# Patient Record
Sex: MALE | Race: WHITE | NOT HISPANIC OR LATINO | URBAN - METROPOLITAN AREA
[De-identification: names, ages, dates, MRNs, and addresses within clinical notes are randomized per-mention and may not be internally consistent; named-entity substitution may affect disease eponyms.]

---

## 2022-10-02 ENCOUNTER — EMERGENCY (EMERGENCY)
Facility: HOSPITAL | Age: 67
LOS: 1 days | Discharge: HOME | End: 2022-10-02
Attending: STUDENT IN AN ORGANIZED HEALTH CARE EDUCATION/TRAINING PROGRAM | Admitting: STUDENT IN AN ORGANIZED HEALTH CARE EDUCATION/TRAINING PROGRAM
Payer: MEDICARE

## 2022-10-02 VITALS
HEART RATE: 95 BPM | RESPIRATION RATE: 18 BRPM | TEMPERATURE: 98 F | OXYGEN SATURATION: 98 % | DIASTOLIC BLOOD PRESSURE: 67 MMHG | SYSTOLIC BLOOD PRESSURE: 113 MMHG

## 2022-10-02 VITALS
SYSTOLIC BLOOD PRESSURE: 98 MMHG | DIASTOLIC BLOOD PRESSURE: 64 MMHG | RESPIRATION RATE: 18 BRPM | HEART RATE: 107 BPM | TEMPERATURE: 98 F | OXYGEN SATURATION: 100 %

## 2022-10-02 DIAGNOSIS — Z90.410 ACQUIRED TOTAL ABSENCE OF PANCREAS: Chronic | ICD-10-CM

## 2022-10-02 PROCEDURE — 99285 EMERGENCY DEPT VISIT HI MDM: CPT

## 2022-10-02 PROCEDURE — 99284 EMERGENCY DEPT VISIT MOD MDM: CPT

## 2022-10-02 NOTE — ED ADULT NURSE REASSESSMENT NOTE - NS ED NURSE REASSESS COMMENT FT1
pt states "I don't want that." RN educated patient that the EKG was to check her heart. pt states "that's not how you check the heart"

## 2022-10-02 NOTE — ED PROVIDER NOTE - CLINICAL SUMMARY MEDICAL DECISION MAKING FREE TEXT BOX
Patient is a 68 y/o M w/ PMHx of small bowel carcinoma s/p whipple who is presenting with weakness in the setting of alcohol and marijuana use. Vital signs significant for tachycardia and hypotension.     Patient currently refusing all physical exam and EKG. States that he "feels great" and would like to leave the ED.

## 2022-10-02 NOTE — ED PROVIDER NOTE - PATIENT PORTAL LINK FT
You can access the FollowMyHealth Patient Portal offered by [facility] by registering at the following website: [email or web address]. By joining [employer]’s FollowMyHealth portal, you will also be able to view your health information using other applications (apps) compatible with our system.

## 2022-10-02 NOTE — ED PROVIDER NOTE - CROS ED EYES ALL NEG
"You can access the [facility], offered by [facility], by registering with the following website: [email or web address]" negative...

## 2022-10-02 NOTE — ED PROVIDER NOTE - OBJECTIVE STATEMENT
Patient is a 66 y/o M w/ PMHx of small bowel carcinoma s/p [place] who is presenting with weakness. Patient states that around 2300 tonight he was drinking with his friends (~4 shots of tequila) and smoked half a joint of marijuana when he stumbled and felt weak. Denies any falls or trauma. He states that his friends called the ambulance to bring him to the ED. Currently states that he feels well and wants to leave the ED. Patient denies any fever, chills, SOB, chest pain, abdominal pain, nausea/vomiting, diarrhea, or urinary symptoms.

## 2022-10-02 NOTE — ED PROVIDER NOTE - NSFOLLOWUPINSTRUCTIONS_ED_ALL_ED_FT
Drink plenty of fluids, rest.  Curb your alcohol intake.   Follow up with your primary care physician for re-evaluation.  Return to er for any new or worsening symptoms.                  Log Out.      [employer] CareNotes®     :  [facility]  	                     ALCOHOL INTOXICATION - AfterCare(R) Instructions(ER/ED)           Alcohol Intoxication    WHAT YOU NEED TO KNOW:    Alcohol intoxication is a harmful physical condition caused when you drink more alcohol than your body can handle. It is also called ethanol poisoning, or being drunk.    DISCHARGE INSTRUCTIONS:    Call your local emergency number (911 in the [place]) if:   •You have sudden trouble breathing or chest pain.      •You have a seizure.      •You feel sad enough to harm yourself or others.      Call your doctor if:   •You have hallucinations (you see or hear things that are not real).      •You cannot stop vomiting.      •You have questions or concerns about your condition or care.      Recommended alcohol limits:   •Men 21 to 64 years should limit alcohol to 2 drinks a day. Do not have more than 4 drinks in 1 day or more than 14 in 1 week.      •All women, and men 65 or older should limit alcohol to 1 drink in a day. Do not have more than 3 drinks in 1 day or more than 7 in 1 week. No amount of alcohol is okay during pregnancy.      Manage alcohol use:   •Decrease the amount you drink. This can help prevent health problems such as brain, heart, and liver damage, high blood pressure, diabetes, and cancer. If you cannot stop completely, healthcare providers can help you set goals to decrease the amount you drink.      •Plan weekly alcohol use. You will be less likely to drink more than the recommended limit if you plan ahead.      •Have food when you drink alcohol. Food will prevent alcohol from getting into your system too quickly. Eat before you have your first alcohol drink.      •Time your drinks carefully. Have no more than 1 drink in an hour. Have a liquid such as water, coffee, or a soft drink between alcohol drinks.      •Do not drive if you have had alcohol. Make sure someone who has not been drinking can help you get home.      •Do not drink alcohol if you are taking medicine. Alcohol is dangerous when you combine it with certain medicines, such as acetaminophen or blood pressure medicine. Talk to your healthcare provider about all the medicines you currently take.      For more information:   •Alcoholics Anonymous  Web Address: http://www.aa.org      •Substance Abuse and Mental Health Services Administration  [address]  [place] [address]  Web Address: http://www.[facility].gov        Follow up with your doctor as directed: Write down your questions so you remember to ask them during your visits.       © Copyright [employer] 2022           back to top                          © Copyright [employer] 2022

## 2022-10-02 NOTE — ED ADULT NURSE REASSESSMENT NOTE - NS ED NURSE REASSESS COMMENT FT1
pt endorsed to me from [facility] Donald, c/o weakness last night. as per report " pt was drinking and use marijuana last night" pending sobriety.

## 2022-10-02 NOTE — ED PROVIDER NOTE - NS ED ATTENDING STATEMENT MOD
I have seen and examined this patient and fully participated in the care of this patient as the teaching attending.  The service was shared with the BRIEN.  I reviewed and verified the documentation and independently performed the documented:

## 2022-10-02 NOTE — ED PROVIDER NOTE - ATTENDING CONTRIBUTION TO CARE
66 y/o M w/ PMHx of small bowel carcinoma s/p whipple who is presenting with weakness. Patient states that around 2300 tonight he was drinking with his friends (~4 shots of tequila) and smoked half a joint of marijuana when he stumbled and felt weak. Denies any falls or trauma. He states that his friends called the ambulance to bring him to the ED. Currently states that he feels well and wants to leave the ED. on exam     General: Awake, alert and oriented. smells like alcohol, No acute distress. Well developed, hydrated and nourished. Appears stated age.   Skin: Skin in warm, dry and intact without rashes or lesions. Appropriate color for ethnicity  HENMT: head normocephalic and atraumatic; bilateral external ears without swelling. no nasal discharge. moist oral mucosa. supple neck, trachea midline  EYES: Conjunctiva clear. nonicteric sclera. EOM intact, Eyelids are normal in appearance without swelling or lesions.  Cardiac: well perfused  Respiratory: breathing comfortably on room air. no audible wheezing or stridor  Abdominal: nondistended, soft, nontender  MSK: Neck and back are without deformity, visible external skin changes, or signs of trauma. Curvature of the cervical, thoracic, and lumbar spine are within normal limits. no external signs of trauma. no apparent deficits in ROM of any extremity  Neurological: The patient is awake, alert and oriented to person, place, and time with normal speech. CN 2-12 grossly intact. moving all extremities spontaenously, able to ambulate, though very unsteady  Psychiatric: Appropriate mood and affect. Good judgement and insight. No visual or auditory hallucinations.     mild tachcyardia and hypotension, medical workup reccomended patient declines refuses poc glucose and other laboratory workup as he says he is "all right" and just needs to go home. unable to ambulate safely at this time, likely experiencing symptoms 2/2 intoxication. will monitor in ED and reasess

## 2022-10-02 NOTE — ED PROVIDER NOTE - PROGRESS NOTE DETAILS
patient sleeping, easily arousable, still too unsteady on his feet, continues to decline medical workup. alert and oriented to his situtation and surroundings. patient sleeping, easily arousable, still too unsteady on his feet, continues to decline medical workup. alert and oriented to his situation and surroundings. Ree: pt received from night team at s/o; pt presents intoxicated with unsteady gait. No signs of head trauma. Pending sobriety. Will continue to monitor. Ree: pt re-assessed and feeling much better, ambulatory to/from bathroom with steady gait. Pt is A&O x 3, states he will hail a taxi home. Pt is clinically sober for dc.

## 2022-10-02 NOTE — ED PROVIDER NOTE - PHYSICAL EXAMINATION
Patient currently refusing all physical exam. States that he "feels great" and wants to leave the ED.

## 2022-10-03 DIAGNOSIS — Z98.890 OTHER SPECIFIED POSTPROCEDURAL STATES: ICD-10-CM

## 2022-10-03 DIAGNOSIS — R53.1 WEAKNESS: ICD-10-CM

## 2022-10-03 DIAGNOSIS — F12.10 CANNABIS ABUSE, UNCOMPLICATED: ICD-10-CM

## 2022-10-03 DIAGNOSIS — R00.0 TACHYCARDIA, UNSPECIFIED: ICD-10-CM

## 2022-10-03 DIAGNOSIS — F10.129 ALCOHOL ABUSE WITH INTOXICATION, UNSPECIFIED: ICD-10-CM

## 2022-10-03 DIAGNOSIS — C17.9 MALIGNANT NEOPLASM OF SMALL INTESTINE, UNSPECIFIED: ICD-10-CM

## 2022-10-03 DIAGNOSIS — I95.9 HYPOTENSION, UNSPECIFIED: ICD-10-CM
